# Patient Record
Sex: FEMALE | URBAN - METROPOLITAN AREA
[De-identification: names, ages, dates, MRNs, and addresses within clinical notes are randomized per-mention and may not be internally consistent; named-entity substitution may affect disease eponyms.]

---

## 2024-02-27 ENCOUNTER — OUTPATIENT (OUTPATIENT)
Dept: OUTPATIENT SERVICES | Facility: HOSPITAL | Age: 34
LOS: 1 days | End: 2024-02-27
Payer: COMMERCIAL

## 2024-02-27 VITALS — HEART RATE: 72 BPM | OXYGEN SATURATION: 100 %

## 2024-02-27 VITALS
TEMPERATURE: 98 F | SYSTOLIC BLOOD PRESSURE: 109 MMHG | HEART RATE: 71 BPM | DIASTOLIC BLOOD PRESSURE: 68 MMHG | RESPIRATION RATE: 17 BRPM

## 2024-02-27 DIAGNOSIS — Z90.89 ACQUIRED ABSENCE OF OTHER ORGANS: Chronic | ICD-10-CM

## 2024-02-27 DIAGNOSIS — O26.899 OTHER SPECIFIED PREGNANCY RELATED CONDITIONS, UNSPECIFIED TRIMESTER: ICD-10-CM

## 2024-02-27 LAB
APTT BLD: 24.9 SEC — SIGNIFICANT CHANGE UP (ref 24.5–35.6)
BASOPHILS # BLD AUTO: 0.05 K/UL — SIGNIFICANT CHANGE UP (ref 0–0.2)
BASOPHILS NFR BLD AUTO: 0.4 % — SIGNIFICANT CHANGE UP (ref 0–2)
EOSINOPHIL # BLD AUTO: 0.07 K/UL — SIGNIFICANT CHANGE UP (ref 0–0.5)
EOSINOPHIL NFR BLD AUTO: 0.6 % — SIGNIFICANT CHANGE UP (ref 0–6)
FIBRINOGEN PPP-MCNC: 416 MG/DL — SIGNIFICANT CHANGE UP (ref 200–445)
HCT VFR BLD CALC: 33.3 % — LOW (ref 34.5–45)
HGB BLD-MCNC: 11.4 G/DL — LOW (ref 11.5–15.5)
IMM GRANULOCYTES NFR BLD AUTO: 0.9 % — SIGNIFICANT CHANGE UP (ref 0–0.9)
INR BLD: 0.94 RATIO — SIGNIFICANT CHANGE UP (ref 0.85–1.18)
KLEIHAUER-BETKE CALCULATION: 0 % — SIGNIFICANT CHANGE UP (ref 0–0.2)
LYMPHOCYTES # BLD AUTO: 1.51 K/UL — SIGNIFICANT CHANGE UP (ref 1–3.3)
LYMPHOCYTES # BLD AUTO: 13 % — SIGNIFICANT CHANGE UP (ref 13–44)
MCHC RBC-ENTMCNC: 32.3 PG — SIGNIFICANT CHANGE UP (ref 27–34)
MCHC RBC-ENTMCNC: 34.2 GM/DL — SIGNIFICANT CHANGE UP (ref 32–36)
MCV RBC AUTO: 94.3 FL — SIGNIFICANT CHANGE UP (ref 80–100)
MONOCYTES # BLD AUTO: 0.75 K/UL — SIGNIFICANT CHANGE UP (ref 0–0.9)
MONOCYTES NFR BLD AUTO: 6.5 % — SIGNIFICANT CHANGE UP (ref 2–14)
NEUTROPHILS # BLD AUTO: 9.12 K/UL — HIGH (ref 1.8–7.4)
NEUTROPHILS NFR BLD AUTO: 78.6 % — HIGH (ref 43–77)
NRBC # BLD: 0 /100 WBCS — SIGNIFICANT CHANGE UP (ref 0–0)
PLATELET # BLD AUTO: 184 K/UL — SIGNIFICANT CHANGE UP (ref 150–400)
PROTHROM AB SERPL-ACNC: 9.9 SEC — SIGNIFICANT CHANGE UP (ref 9.5–13)
RBC # BLD: 3.53 M/UL — LOW (ref 3.8–5.2)
RBC # FLD: 13.2 % — SIGNIFICANT CHANGE UP (ref 10.3–14.5)
WBC # BLD: 11.6 K/UL — HIGH (ref 3.8–10.5)
WBC # FLD AUTO: 11.6 K/UL — HIGH (ref 3.8–10.5)

## 2024-02-27 PROCEDURE — 85730 THROMBOPLASTIN TIME PARTIAL: CPT

## 2024-02-27 PROCEDURE — 85460 HEMOGLOBIN FETAL: CPT

## 2024-02-27 PROCEDURE — 85025 COMPLETE CBC W/AUTO DIFF WBC: CPT

## 2024-02-27 PROCEDURE — 85384 FIBRINOGEN ACTIVITY: CPT

## 2024-02-27 PROCEDURE — G0463: CPT

## 2024-02-27 PROCEDURE — 85610 PROTHROMBIN TIME: CPT

## 2024-02-27 NOTE — OB RN TRIAGE NOTE - RESPIRATORY RATE (BREATHS/MIN)
18 You can access the [facility] Patient Portal, offered by [facility], by registering with the following website: [email or web address]

## 2024-02-27 NOTE — OB PROVIDER TRIAGE NOTE - NSHPPHYSICALEXAM_GEN_ALL_CORE
T(C): 36.7 (02-27-24 @ 01:52), Max: 36.7 (02-27-24 @ 01:52)  HR: 67 (02-27-24 @ 02:29) (61 - 85)  BP: 108/64 (02-27-24 @ 01:52) (108/64 - 108/64)  RR: 18 (02-27-24 @ 01:52) (18 - 18)  SpO2: 100% (02-27-24 @ 02:29) (92% - 100%)    Gen: NAD  CV: RRR  Pulm: breathing comfortably on RA  Abd: gravid, nontender  Extr: moving all extremities with ease  – FHT Cat I: baseline 125, mod variability, +accels, -decels  – [place]uf[place]: irritability with rare contraction not felt by patient  – Sono: vertex
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
## 2024-02-27 NOTE — OB RN TRIAGE NOTE - FALL HARM RISK - UNIVERSAL INTERVENTIONS
Bed in lowest position, wheels locked, appropriate side rails in place/Call bell, personal items and telephone in reach/Instruct patient to call for assistance before getting out of bed or chair/Non-slip footwear when patient is out of bed/[place] to call system/Physically safe environment - no spills, clutter or unnecessary equipment/Purposeful Proactive Rounding/Room/bathroom lighting operational, light cord in reach

## 2024-02-28 DIAGNOSIS — Z04.1 ENCOUNTER FOR EXAMINATION AND OBSERVATION FOLLOWING TRANSPORT ACCIDENT: ICD-10-CM

## 2024-02-28 DIAGNOSIS — O47.03 FALSE LABOR BEFORE 37 COMPLETED WEEKS OF GESTATION, THIRD TRIMESTER: ICD-10-CM

## 2024-02-28 DIAGNOSIS — Z3A.34 34 WEEKS GESTATION OF PREGNANCY: ICD-10-CM

## 2024-04-12 PROBLEM — Z98.890 OTHER SPECIFIED POSTPROCEDURAL STATES: Chronic | Status: ACTIVE | Noted: 2024-02-27

## 2024-04-12 PROBLEM — Z87.898 PERSONAL HISTORY OF OTHER SPECIFIED CONDITIONS: Chronic | Status: ACTIVE | Noted: 2024-02-27

## 2024-04-14 ENCOUNTER — INPATIENT (INPATIENT)
Facility: HOSPITAL | Age: 34
LOS: 3 days | Discharge: HOME | DRG: 833 | End: 2024-04-18
Attending: OBSTETRICS & GYNECOLOGY | Admitting: OBSTETRICS & GYNECOLOGY
Payer: COMMERCIAL

## 2024-04-14 VITALS
OXYGEN SATURATION: 97 % | SYSTOLIC BLOOD PRESSURE: 117 MMHG | TEMPERATURE: 99 F | DIASTOLIC BLOOD PRESSURE: 72 MMHG | HEART RATE: 90 BPM

## 2024-04-14 DIAGNOSIS — O48.0 POST-TERM PREGNANCY: ICD-10-CM

## 2024-04-14 DIAGNOSIS — Z90.89 ACQUIRED ABSENCE OF OTHER ORGANS: Chronic | ICD-10-CM

## 2024-04-14 LAB
BASOPHILS # BLD AUTO: 0.04 K/UL — SIGNIFICANT CHANGE UP (ref 0–0.2)
BASOPHILS NFR BLD AUTO: 0.3 % — SIGNIFICANT CHANGE UP (ref 0–2)
BLD GP AB SCN SERPL QL: NEGATIVE — SIGNIFICANT CHANGE UP
EOSINOPHIL # BLD AUTO: 0.02 K/UL — SIGNIFICANT CHANGE UP (ref 0–0.5)
EOSINOPHIL NFR BLD AUTO: 0.2 % — SIGNIFICANT CHANGE UP (ref 0–6)
HCT VFR BLD CALC: 36.6 % — SIGNIFICANT CHANGE UP (ref 34.5–45)
HGB BLD-MCNC: 12.6 G/DL — SIGNIFICANT CHANGE UP (ref 11.5–15.5)
IMM GRANULOCYTES NFR BLD AUTO: 1 % — HIGH (ref 0–0.9)
LYMPHOCYTES # BLD AUTO: 1.12 K/UL — SIGNIFICANT CHANGE UP (ref 1–3.3)
LYMPHOCYTES # BLD AUTO: 9.7 % — LOW (ref 13–44)
MCHC RBC-ENTMCNC: 31.5 PG — SIGNIFICANT CHANGE UP (ref 27–34)
MCHC RBC-ENTMCNC: 34.4 GM/DL — SIGNIFICANT CHANGE UP (ref 32–36)
MCV RBC AUTO: 91.5 FL — SIGNIFICANT CHANGE UP (ref 80–100)
MONOCYTES # BLD AUTO: 0.68 K/UL — SIGNIFICANT CHANGE UP (ref 0–0.9)
MONOCYTES NFR BLD AUTO: 5.9 % — SIGNIFICANT CHANGE UP (ref 2–14)
NEUTROPHILS # BLD AUTO: 9.6 K/UL — HIGH (ref 1.8–7.4)
NEUTROPHILS NFR BLD AUTO: 82.9 % — HIGH (ref 43–77)
NRBC # BLD: 0 /100 WBCS — SIGNIFICANT CHANGE UP (ref 0–0)
PLATELET # BLD AUTO: 205 K/UL — SIGNIFICANT CHANGE UP (ref 150–400)
RBC # BLD: 4 M/UL — SIGNIFICANT CHANGE UP (ref 3.8–5.2)
RBC # FLD: 13 % — SIGNIFICANT CHANGE UP (ref 10.3–14.5)
RH IG SCN BLD-IMP: POSITIVE — SIGNIFICANT CHANGE UP
RH IG SCN BLD-IMP: POSITIVE — SIGNIFICANT CHANGE UP
WBC # BLD: 11.58 K/UL — HIGH (ref 3.8–10.5)
WBC # FLD AUTO: 11.58 K/UL — HIGH (ref 3.8–10.5)

## 2024-04-14 RX ORDER — SODIUM CHLORIDE 9 MG/ML
1000 INJECTION, SOLUTION INTRAVENOUS
Refills: 0 | Status: DISCONTINUED | OUTPATIENT
Start: 2024-04-14 | End: 2024-04-15

## 2024-04-14 RX ORDER — CITRIC ACID/SODIUM CITRATE 300-500 MG
15 SOLUTION, ORAL ORAL EVERY 6 HOURS
Refills: 0 | Status: DISCONTINUED | OUTPATIENT
Start: 2024-04-14 | End: 2024-04-15

## 2024-04-14 RX ORDER — CHLORHEXIDINE GLUCONATE 213 G/1000ML
1 SOLUTION TOPICAL DAILY
Refills: 0 | Status: DISCONTINUED | OUTPATIENT
Start: 2024-04-14 | End: 2024-04-15

## 2024-04-14 RX ORDER — OXYTOCIN 10 UNIT/ML
333.33 VIAL (ML) INJECTION
Qty: 20 | Refills: 0 | Status: DISCONTINUED | OUTPATIENT
Start: 2024-04-14 | End: 2024-04-18

## 2024-04-14 RX ADMIN — SODIUM CHLORIDE 125 MILLILITER(S): 9 INJECTION, SOLUTION INTRAVENOUS at 18:19

## 2024-04-14 RX ADMIN — SODIUM CHLORIDE 125 MILLILITER(S): 9 INJECTION, SOLUTION INTRAVENOUS at 18:00

## 2024-04-14 NOTE — OB PROVIDER H&P - HISTORY OF PRESENT ILLNESS
33 y.o. Female [id number] 24 IUP @ 41  wks. gest., (-) GBS, presents for IOL for Post-Dates.  (+) FM.  (-) Ctrx.  (-) Vaginal Bleeding/Fluid.  Uncomplicated PNC.  EFW 3500 gms.    POBHx:  Denies    PGynHx:  Abn Pap, s/p Colpo, subsequent Pap nl    PMHx:  Denies    PSHx:  T&A age 11    Meds:  PNV 1 p.o. daily    NKDA    SocHx:  Denies smoking tobacco/ETOH/Illegal drug use    FHx:  Denies

## 2024-04-14 NOTE — OB RN PATIENT PROFILE - FALL HARM RISK - UNIVERSAL INTERVENTIONS
Bed in lowest position, wheels locked, appropriate side rails in place/Call bell, personal items and telephone in reach/Instruct patient to call for assistance before getting out of bed or chair/Non-slip footwear when patient is out of bed/[place] to call system/Purposeful Proactive Rounding/Room/bathroom lighting operational, light cord in reach

## 2024-04-14 NOTE — OB PROVIDER H&P - NS_CSECTION_OBGYN_ALL_OB_NU
Diet controlled   A1c (two weeks ago) 6.9%  Glucose 221 on admission    LDSSI  POCT BG q6h while NPO     0

## 2024-04-14 NOTE — OB PROVIDER H&P - ASSESSMENT
33 y.o. Female [id number] 24 IUP @ 41 1/ wks. gest., (-) GBS, presents for IOL for Post-Dates.  (+) FM.  (-) Ctrx.  (-) Vaginal Bleeding/Fluid.  Uncomplicated PNC.  EFW 3500 gms.    PLAN:  Admit to L&D  clear Liquid Diet, then NPO in labor  BR  EFM/TOCO  Anesthesia Consult Routine labs  IVF  IOL with PO Cytotec, then CB @ midnight, then Pitocin @ 5 a.m.  Anticipate vaginal delivery    ALICE Sam PA-C  D/W Dr. Escalante

## 2024-04-15 LAB — T PALLIDUM AB TITR SER: NEGATIVE — SIGNIFICANT CHANGE UP

## 2024-04-15 DEVICE — SURGICEL 2 X 14": Type: IMPLANTABLE DEVICE | Status: FUNCTIONAL

## 2024-04-15 RX ORDER — OXYTOCIN 10 UNIT/ML
2 VIAL (ML) INJECTION
Qty: 30 | Refills: 0 | Status: DISCONTINUED | OUTPATIENT
Start: 2024-04-15 | End: 2024-04-18

## 2024-04-15 RX ORDER — SODIUM CHLORIDE 9 MG/ML
1000 INJECTION INTRAMUSCULAR; INTRAVENOUS; SUBCUTANEOUS
Refills: 0 | Status: DISCONTINUED | OUTPATIENT
Start: 2024-04-15 | End: 2024-04-18

## 2024-04-15 RX ORDER — OXYTOCIN 10 UNIT/ML
4 VIAL (ML) INJECTION
Qty: 30 | Refills: 0 | Status: DISCONTINUED | OUTPATIENT
Start: 2024-04-15 | End: 2024-04-15

## 2024-04-15 RX ORDER — LANOLIN
1 OINTMENT (GRAM) TOPICAL EVERY 6 HOURS
Refills: 0 | Status: DISCONTINUED | OUTPATIENT
Start: 2024-04-15 | End: 2024-04-18

## 2024-04-15 RX ORDER — DIPHENHYDRAMINE HCL 50 MG
25 CAPSULE ORAL EVERY 6 HOURS
Refills: 0 | Status: DISCONTINUED | OUTPATIENT
Start: 2024-04-15 | End: 2024-04-18

## 2024-04-15 RX ORDER — SODIUM CHLORIDE 9 MG/ML
1000 INJECTION, SOLUTION INTRAVENOUS ONCE
Refills: 0 | Status: DISCONTINUED | OUTPATIENT
Start: 2024-04-15 | End: 2024-04-18

## 2024-04-15 RX ORDER — SODIUM CHLORIDE 9 MG/ML
500 INJECTION, SOLUTION INTRAVENOUS ONCE
Refills: 0 | Status: COMPLETED | OUTPATIENT
Start: 2024-04-15 | End: 2024-04-15

## 2024-04-15 RX ORDER — SODIUM CHLORIDE 9 MG/ML
1000 INJECTION, SOLUTION INTRAVENOUS
Refills: 0 | Status: DISCONTINUED | OUTPATIENT
Start: 2024-04-15 | End: 2024-04-18

## 2024-04-15 RX ORDER — CITRIC ACID/SODIUM CITRATE 300-500 MG
30 SOLUTION, ORAL ORAL ONCE
Refills: 0 | Status: DISCONTINUED | OUTPATIENT
Start: 2024-04-15 | End: 2024-04-18

## 2024-04-15 RX ORDER — SIMETHICONE 80 MG/1
80 TABLET, CHEWABLE ORAL EVERY 4 HOURS
Refills: 0 | Status: DISCONTINUED | OUTPATIENT
Start: 2024-04-15 | End: 2024-04-18

## 2024-04-15 RX ORDER — MAGNESIUM HYDROXIDE 400 MG/1
30 TABLET, CHEWABLE ORAL
Refills: 0 | Status: DISCONTINUED | OUTPATIENT
Start: 2024-04-15 | End: 2024-04-18

## 2024-04-15 RX ORDER — SODIUM CHLORIDE 9 MG/ML
1000 INJECTION, SOLUTION INTRAVENOUS ONCE
Refills: 0 | Status: COMPLETED | OUTPATIENT
Start: 2024-04-15 | End: 2024-04-15

## 2024-04-15 RX ORDER — HEPARIN SODIUM 5000 [USP'U]/ML
5000 INJECTION INTRAVENOUS; SUBCUTANEOUS EVERY 12 HOURS
Refills: 0 | Status: DISCONTINUED | OUTPATIENT
Start: 2024-04-15 | End: 2024-04-18

## 2024-04-15 RX ORDER — OXYCODONE HYDROCHLORIDE 5 MG/1
5 TABLET ORAL ONCE
Refills: 0 | Status: DISCONTINUED | OUTPATIENT
Start: 2024-04-15 | End: 2024-04-18

## 2024-04-15 RX ORDER — OXYTOCIN 10 UNIT/ML
333.33 VIAL (ML) INJECTION
Qty: 20 | Refills: 0 | Status: DISCONTINUED | OUTPATIENT
Start: 2024-04-15 | End: 2024-04-18

## 2024-04-15 RX ORDER — FAMOTIDINE 10 MG/ML
20 INJECTION INTRAVENOUS ONCE
Refills: 0 | Status: COMPLETED | OUTPATIENT
Start: 2024-04-15 | End: 2024-04-15

## 2024-04-15 RX ORDER — OXYCODONE HYDROCHLORIDE 5 MG/1
5 TABLET ORAL
Refills: 0 | Status: COMPLETED | OUTPATIENT
Start: 2024-04-15 | End: 2024-04-22

## 2024-04-15 RX ORDER — SODIUM CHLORIDE 9 MG/ML
300 INJECTION INTRAMUSCULAR; INTRAVENOUS; SUBCUTANEOUS ONCE
Refills: 0 | Status: COMPLETED | OUTPATIENT
Start: 2024-04-15 | End: 2024-04-15

## 2024-04-15 RX ORDER — ACETAMINOPHEN 500 MG
975 TABLET ORAL
Refills: 0 | Status: DISCONTINUED | OUTPATIENT
Start: 2024-04-15 | End: 2024-04-18

## 2024-04-15 RX ORDER — SODIUM CHLORIDE 9 MG/ML
500 INJECTION, SOLUTION INTRAVENOUS ONCE
Refills: 0 | Status: DISCONTINUED | OUTPATIENT
Start: 2024-04-15 | End: 2024-04-18

## 2024-04-15 RX ORDER — MORPHINE SULFATE 50 MG/1
2 CAPSULE, EXTENDED RELEASE ORAL ONCE
Refills: 0 | Status: DISCONTINUED | OUTPATIENT
Start: 2024-04-15 | End: 2024-04-16

## 2024-04-15 RX ORDER — KETOROLAC TROMETHAMINE 30 MG/ML
30 SYRINGE (ML) INJECTION EVERY 6 HOURS
Refills: 0 | Status: DISCONTINUED | OUTPATIENT
Start: 2024-04-15 | End: 2024-04-16

## 2024-04-15 RX ORDER — IBUPROFEN 200 MG
600 TABLET ORAL EVERY 6 HOURS
Refills: 0 | Status: COMPLETED | OUTPATIENT
Start: 2024-04-15 | End: 2025-03-14

## 2024-04-15 RX ORDER — TETANUS TOXOID, REDUCED DIPHTHERIA TOXOID AND ACELLULAR PERTUSSIS VACCINE, ADSORBED 5; 2.5; 8; 8; 2.5 [IU]/.5ML; [IU]/.5ML; UG/.5ML; UG/.5ML; UG/.5ML
0.5 SUSPENSION INTRAMUSCULAR ONCE
Refills: 0 | Status: DISCONTINUED | OUTPATIENT
Start: 2024-04-15 | End: 2024-04-18

## 2024-04-15 RX ORDER — ACETAMINOPHEN 500 MG
975 TABLET ORAL ONCE
Refills: 0 | Status: COMPLETED | OUTPATIENT
Start: 2024-04-15 | End: 2024-04-15

## 2024-04-15 RX ADMIN — FAMOTIDINE 20 MILLIGRAM(S): 10 INJECTION INTRAVENOUS at 13:16

## 2024-04-15 RX ADMIN — Medication 30 MILLIGRAM(S): at 20:42

## 2024-04-15 RX ADMIN — SODIUM CHLORIDE 600 MILLILITER(S): 9 INJECTION INTRAMUSCULAR; INTRAVENOUS; SUBCUTANEOUS at 08:26

## 2024-04-15 RX ADMIN — Medication 2 MILLIUNIT(S)/MIN: at 06:39

## 2024-04-15 RX ADMIN — SODIUM CHLORIDE 1000 MILLILITER(S): 9 INJECTION, SOLUTION INTRAVENOUS at 10:15

## 2024-04-15 RX ADMIN — Medication 30 MILLIGRAM(S): at 21:10

## 2024-04-15 RX ADMIN — Medication 975 MILLIGRAM(S): at 18:51

## 2024-04-15 RX ADMIN — Medication 975 MILLIGRAM(S): at 18:16

## 2024-04-15 RX ADMIN — SODIUM CHLORIDE 500 MILLILITER(S): 9 INJECTION, SOLUTION INTRAVENOUS at 07:47

## 2024-04-15 RX ADMIN — Medication 0.25 MILLIGRAM(S): at 05:51

## 2024-04-15 RX ADMIN — HEPARIN SODIUM 5000 UNIT(S): 5000 INJECTION INTRAVENOUS; SUBCUTANEOUS at 20:42

## 2024-04-15 RX ADMIN — Medication 975 MILLIGRAM(S): at 11:19

## 2024-04-15 RX ADMIN — Medication 975 MILLIGRAM(S): at 23:55

## 2024-04-15 RX ADMIN — Medication 15 MILLILITER(S): at 13:16

## 2024-04-15 RX ADMIN — SODIUM CHLORIDE 125 MILLILITER(S): 9 INJECTION INTRAMUSCULAR; INTRAVENOUS; SUBCUTANEOUS at 08:45

## 2024-04-15 RX ADMIN — Medication 975 MILLIGRAM(S): at 10:21

## 2024-04-15 NOTE — OB RN DELIVERY SUMMARY - NS_SEPSISRSKCALC_OBGYN_ALL_OB_FT
EOS calculated successfully. EOS Risk Factor: 0.5/1000 live births ([facility] national incidence); GA=41w2d; Temp=100.22; ROM=6.183; GBS='Negative'; Antibiotics='No antibiotics or any antibiotics < 2 hrs prior to birth'

## 2024-04-15 NOTE — OB PROVIDER LABOR PROGRESS NOTE - NSVAGINALEXAM_OBGYN_ALL_OB_DT
15-Apr-2024 04:45
15-Apr-2024 11:21
15-Apr-2024 13:10
15-Apr-2024 08:28
15-Apr-2024 15:07
15-Apr-2024 09:24
15-Apr-2024 05:15
15-Apr-2024 00:38

## 2024-04-15 NOTE — OB PROVIDER DELIVERY SUMMARY - NSSELHIDDEN_OBGYN_ALL_OB_FT
[NS_DeliveryAttending1_OBGYN_ALL_OB_FT:[id number]==],[NS_DeliveryAssist1_OBGYN_ALL_OB_FT:[id number]],[NS_DeliveryAssist2_OBGYN_ALL_OB_FT:[id number]] [NS_DeliveryAttending1_OBGYN_ALL_OB_FT:[id number]=],[NS_DeliveryAssist1_OBGYN_ALL_OB_FT:[id number]=],[NS_DeliveryAssist2_OBGYN_ALL_OB_FT:[id number]]

## 2024-04-15 NOTE — OB RN INTRAOPERATIVE NOTE - NSSELHIDDEN_OBGYN_ALL_OB_FT
[NS_DeliveryAttending1_OBGYN_ALL_OB_FT:[id number]],[NS_DeliveryAssist1_OBGYN_ALL_OB_FT:[id number]],[NS_DeliveryAssist2_OBGYN_ALL_OB_FT:[id number]=],[NS_DeliveryRN_OBGYN_ALL_OB_FT:[id number]],[NS_CirculateRN2_OBGYN_ALL_OB_FT:[id number]]

## 2024-04-15 NOTE — OB PROVIDER LABOR PROGRESS NOTE - NS_OBIHIFHRDETAILS_OBGYN_ALL_OB_FT
Cat 2
Cat 2
category 2, baseline 130, moderate variability, + accels, + recurrent late decelerations
130/mod/+acels/-decels
Cat 2
140 mod variability - 2 minute prolonged deceleration with spontaneous return to baseline
Cat 2
category 2, baseline 140, moderate variability, + accels, + recurrent variable decelerations
Cat 2

## 2024-04-15 NOTE — OB PROVIDER DELIVERY SUMMARY - NSPROVIDERDELIVERYNOTE_OBGYN_ALL_OB_FT
Primary LTCS due to category 2 tracing, uncomplicated.  Viable female infant, vertex presentation, weight 3135g , Apgars 9/9, cord gasses sent. Infant head noticeably not engaged in pelvis.  Grossly normal fallopian tubes, uterus, and ovaries.    EBL: 800  IVF: 2000  UOP: 350    Dr. Whiting and Dr. Piedra, PGY-2 at delivery  Sirisha Chase, PGY-1 (E4) spontaneous Primary LTCS due to category 2 tracing, uncomplicated.  Viable female infant, vertex presentation, weight 3135g , Apgars 9/9, cord gasses sent. Infant head noticeably not engaged in pelvis.  Grossly normal fallopian tubes, uterus, and ovaries.  Surgicel powder applied over hysterotomy.    EBL: 800  IVF: 2000  UOP: 350    Dr. Whiting and Dr. Piedra, PGY-2 at delivery  Sirisha Chase, PGY-1 Primary LTCS due to category 2 tracing, uncomplicated.  Viable female infant, vertex presentation, weight 3135g , Apgars 9/9, cord gasses sent.  Grossly normal fallopian tubes, uterus, and ovaries.  Surgicel powder applied over hysterotomy.    EBL: 800  IVF: 2000  UOP: 350    Dictation #[id number]    Dr. Whiting and Dr. Piedra, PGY-2 at delivery  Sirisha Chase, PGY-1 Primary LTCS due to category 2 tracing, uncomplicated.  Viable female infant, vertex presentation, weight 3135g , Apgars 9/9, cord gasses sent.  Grossly normal fallopian tubes, uterus, and ovaries.  Surgicel powder applied over hysterotomy.    EBL: 800  IVF: 1350  UOP: 350    Dictation #[id number]    Dr. Whiting and Dr. Piedra, PGY-2 at delivery  Sirisha Chase, PGY-1 Fusiform Excision Additional Text (Leave Blank If You Do Not Want): The margin was drawn around the clinically apparent lesion.  A fusiform shape was then drawn on the skin incorporating the lesion and margins.  Incisions were then made along these lines to the appropriate tissue plane and the lesion was extirpated.

## 2024-04-15 NOTE — PRE-ANESTHESIA EVALUATION ADULT - HEART RATE (BEATS/MIN)
PRE-SEDATION ASSESSMENT    CONSENT  Consent for procedure and sedation obtained: Yes    MEDICAL HISTORY  Significant medical/surgical history: No  Past Complications with Sedation/Anesthesia: No  Significant Family History: No  Smoking History: No  Alcohol/Drug abuse: No  Possible Pregnancy (LMP): No  Cardiac History: No  Respiratory History: No    PHYSICAL EXAM  History and Physical Reviewed: H&P completed today  Airway Risk History: No previous complications  Airway Anatomy : Class II  Heart : Normal  Lungs : Normal  LOC/Mental Status : Normal    OTHER FINDINGS  Reviewed current medications and allergies: Yes  Pertinent lab/diagnostic test reviewed: Yes    SEDATION RISK ASSESSMENT  Risk Status ASA: Class II - Normal patient with mild systemic disease  Plan for Sedation: Moderate Sedation  EKG Monitoring: Yes    NARRATIVE FINDINGS      56

## 2024-04-15 NOTE — OB PROVIDER LABOR PROGRESS NOTE - NS_SUBJECTIVE/OBJECTIVE_OBGYN_ALL_OB_FT
Pt evaluated at bedside for recurrent variable decels. Adequate ctx pattern. The patient has been the same exam since 7am. Discussed MOD with patient and patient agrees with plan to proceed with primary c/s for NRFHT.
Pt evaluated at bedside for AROM. AROM performed blood tinged. Pt has intermittent variable decels. Will re-assess for IUPC w/ amnio
Patient with category 2 tracing with recurrent variable decelerations.
Pt seen and evaluated at bedside for Cat 2 FHT.   VE unchanged   Terbutaline given   Pitocin has not been started     Vital Signs Last 24 Hrs  T(C): 37.1 (15 Apr 2024 05:09), Max: 37.3 (14 Apr 2024 19:03)  T(F): 98.78 (15 Apr 2024 05:09), Max: 99.14 (14 Apr 2024 19:03)  HR: 82 (15 Apr 2024 05:59) (56 - 107)  BP: 102/60 (15 Apr 2024 05:56) (98/59 - 154/75)  RR: 18 (15 Apr 2024 05:09) (18 - 18)  SpO2: 95% (15 Apr 2024 05:59) (90% - 100%)
R1 Progress Note    Patient seen and examined at bedside for placement of cervical balloon. Cervical balloon placed w/o complications. 60cc NS placed in uterine & vaginal balloons. Pt tolerated procedure well.    NAD  Vital Signs Last 24 Hrs  T(C): 37.1 (14 Apr 2024 23:27), Max: 37.3 (14 Apr 2024 19:03)  T(F): 98.78 (14 Apr 2024 23:27), Max: 99.14 (14 Apr 2024 19:03)  HR: 57 (15 Apr 2024 00:26) (56 - 94)  BP: 101/56 (14 Apr 2024 23:27) (101/56 - 117/72)  BP(mean): --  RR: 18 (14 Apr 2024 23:27) (18 - 18)  SpO2: 97% (15 Apr 2024 00:26) (93% - 100%)    Parameters below as of 14 Apr 2024 17:20  Patient On (Oxygen Delivery Method): room air
Patient seen and examined due to category 2 tracing with recurrent late decelerations.
Pt evaluated at bedside. No cervical change. intermittent variable decels. Repositioned with improvement.
Pt seen and examined at bedside for Cat 2 FHT, s/p epidural placement. VSS.   Cervical balloon remains in place  VE: 2/50/-3   LR bolus ordered, pt repositioned for resuscitative measures       Vital Signs Last 24 Hrs  T(C): 36.8 (15 Apr 2024 04:30), Max: 37.3 (14 Apr 2024 19:03)  T(F): 98.24 (15 Apr 2024 03:11), Max: 99.14 (14 Apr 2024 19:03)  HR: 85 (15 Apr 2024 04:54) (56 - 107)  BP: 98/59 (15 Apr 2024 04:54) (98/59 - 154/75)  RR: 18 (15 Apr 2024 04:30) (18 - 18)  SpO2: 99% (15 Apr 2024 04:54) (90% - 100%)
Pt seen and examined at bedside for Cat 2 tracing, cervical balloon removed.   VE: 2/70/-3 intact   Repositioned for resuscitative measures.     Vital Signs Last 24 Hrs  T(C): 37.1 (15 Apr 2024 05:09), Max: 37.3 (14 Apr 2024 19:03)  T(F): 98.78 (15 Apr 2024 05:09), Max: 99.14 (14 Apr 2024 19:03)  HR: 72 (15 Apr 2024 05:29) (56 - 107)  BP: 108/64 (15 Apr 2024 05:25) (98/59 - 154/75)  RR: 18 (15 Apr 2024 05:09) (18 - 18)  SpO2: 95% (15 Apr 2024 05:29) (90% - 100%)

## 2024-04-15 NOTE — OB NEONATOLOGY/PEDIATRICIAN DELIVERY SUMMARY - NSPEDSNEONOTESA_OBGYN_ALL_OB_FT
Requested by OB to attend primary C/S for Category II tracing. 41.2 week infant born to a 31yr old  mother with a history of abnormal PAP smear, with colposcopy. GBS negative. Maternal Labs neg/imm/nr. Maternal Blood Type O+. AROM at 0752, clear fluid. Maternal temp of 37.9, received Tylenol. Infant emerged vigorous and crying. Delayed cord clamping 1 minute. Dried, stimulated and suctioned. Exam unremarkable. APGARS 9/9.   Mother plans to breast feed exclusively

## 2024-04-15 NOTE — OB RN DELIVERY SUMMARY - NSSELHIDDEN_OBGYN_ALL_OB_FT
[NS_DeliveryAttending1_OBGYN_ALL_OB_FT:[id number]],[NS_DeliveryAssist1_OBGYN_ALL_OB_FT:[id number]],[NS_DeliveryAssist2_OBGYN_ALL_OB_FT:[id number]],[NS_DeliveryRN_OBGYN_ALL_OB_FT:[id number]],[NS_CirculateRN2_OBGYN_ALL_OB_FT:[id number]=]

## 2024-04-15 NOTE — OB PROVIDER LABOR PROGRESS NOTE - ASSESSMENT
A/P: 34yo P0 @41.2 weeks gestation admitted for late term IOL.  - IOL s/p PO/CB  - Plan to start pitocin @530a   - EFM/[place] continuous monitoring   - GBS negative   - Resuscitative measures prn   - Continue with epidural for pain mgmt    - Plan per Dr. Boris Martinez, PA-C   
A/P: 34yo P0 @41.2 weeks gestation admitted for late term IOL.  - IOL s/p PO/CB  - Plan to start pitocin when able s/p terbutaline   - EFM/[place] continuous monitoring   - GBS negative   - Resuscitative measures prn   - Continue with epidural for pain mgmt    - D/w Dr. Henok Martinez, PAAbdirashidC   
- Exam unchanged  - Pitocin paused  - Patient with continued category 2 tracing despite amnioinfusion, repositioning, and attempts at maternal resuscitation    Dr. Whiting called to bedside to discuss options with patient.  ANGELIQUE Chase, PGY-1
- IUPC placed, plan to start amnioinfusion with 300cc normal saline bolus and 125cc/hr maintenance rate due to recurrent variable decelerations  - Overall moderate variability and presence of accelerations reassuring  - Patient placed on right lateral side, improvement in decelerations after repositioning and amnioinfusion  - Currently on pitocin of 2, MVUs are adequate  - Continue pitocin and amnioinfusion  - Continuous EFM/[place]    d/w Dr. Alford,   ANGELIQUE Chase, PGY-1
A/P: 34yo P0 @41.2 weeks gestation admitted for late term IOL.  - Continue IOL with cervical balloon, s/p PO  - Plan to start pitocin @5-530a   - EFM/[place] continuous monitoring   - GBS negative   - Resuscitative measures prn   - LR bolus ordered   - Continue close BP monitoring s/p epidural   - Plan per Dr. Boris Martinez, PA-C 
EFM + [place] cat 2   AROM performed.  Reassess if no improvement in variables for IUPC/Amnio  Epidural effective    [email or web address]
A/P: P0 LT IOL s/p cervical balloon placement.   - FHR category 1   - c/w PO cytotec   - Pitocin @5a    D/w Dr. Boris Phillips, PGY-1 
EFM + toco cat 2   Anesthesia & Nursing notified  Prepare for  section    ryan
EFM + [place]co cat 2   Cont resuscitation  Cont pitocin  Counseled patient on possible c/s if continues     ryan

## 2024-04-16 LAB
BASOPHILS # BLD AUTO: 0.05 K/UL — SIGNIFICANT CHANGE UP (ref 0–0.2)
BASOPHILS NFR BLD AUTO: 0.3 % — SIGNIFICANT CHANGE UP (ref 0–2)
EOSINOPHIL # BLD AUTO: 0.04 K/UL — SIGNIFICANT CHANGE UP (ref 0–0.5)
EOSINOPHIL NFR BLD AUTO: 0.2 % — SIGNIFICANT CHANGE UP (ref 0–6)
HCT VFR BLD CALC: 27.7 % — LOW (ref 34.5–45)
HGB BLD-MCNC: 9.4 G/DL — LOW (ref 11.5–15.5)
IMM GRANULOCYTES NFR BLD AUTO: 0.7 % — SIGNIFICANT CHANGE UP (ref 0–0.9)
LYMPHOCYTES # BLD AUTO: 1.72 K/UL — SIGNIFICANT CHANGE UP (ref 1–3.3)
LYMPHOCYTES # BLD AUTO: 10 % — LOW (ref 13–44)
MCHC RBC-ENTMCNC: 31.3 PG — SIGNIFICANT CHANGE UP (ref 27–34)
MCHC RBC-ENTMCNC: 33.9 GM/DL — SIGNIFICANT CHANGE UP (ref 32–36)
MCV RBC AUTO: 92.3 FL — SIGNIFICANT CHANGE UP (ref 80–100)
MONOCYTES # BLD AUTO: 1.3 K/UL — HIGH (ref 0–0.9)
MONOCYTES NFR BLD AUTO: 7.5 % — SIGNIFICANT CHANGE UP (ref 2–14)
NEUTROPHILS # BLD AUTO: 14.01 K/UL — HIGH (ref 1.8–7.4)
NEUTROPHILS NFR BLD AUTO: 81.3 % — HIGH (ref 43–77)
NRBC # BLD: 0 /100 WBCS — SIGNIFICANT CHANGE UP (ref 0–0)
PLATELET # BLD AUTO: 179 K/UL — SIGNIFICANT CHANGE UP (ref 150–400)
RBC # BLD: 3 M/UL — LOW (ref 3.8–5.2)
RBC # FLD: 13 % — SIGNIFICANT CHANGE UP (ref 10.3–14.5)
WBC # BLD: 17.24 K/UL — HIGH (ref 3.8–10.5)
WBC # FLD AUTO: 17.24 K/UL — HIGH (ref 3.8–10.5)

## 2024-04-16 RX ORDER — IBUPROFEN 200 MG
600 TABLET ORAL EVERY 6 HOURS
Refills: 0 | Status: DISCONTINUED | OUTPATIENT
Start: 2024-04-16 | End: 2024-04-18

## 2024-04-16 RX ORDER — OXYCODONE HYDROCHLORIDE 5 MG/1
5 TABLET ORAL
Refills: 0 | Status: DISCONTINUED | OUTPATIENT
Start: 2024-04-16 | End: 2024-04-18

## 2024-04-16 RX ADMIN — Medication 30 MILLIGRAM(S): at 09:30

## 2024-04-16 RX ADMIN — Medication 30 MILLIGRAM(S): at 16:13

## 2024-04-16 RX ADMIN — Medication 975 MILLIGRAM(S): at 00:50

## 2024-04-16 RX ADMIN — Medication 600 MILLIGRAM(S): at 20:57

## 2024-04-16 RX ADMIN — Medication 30 MILLIGRAM(S): at 15:43

## 2024-04-16 RX ADMIN — Medication 975 MILLIGRAM(S): at 13:14

## 2024-04-16 RX ADMIN — Medication 975 MILLIGRAM(S): at 06:50

## 2024-04-16 RX ADMIN — OXYCODONE HYDROCHLORIDE 5 MILLIGRAM(S): 5 TABLET ORAL at 12:54

## 2024-04-16 RX ADMIN — Medication 30 MILLIGRAM(S): at 10:00

## 2024-04-16 RX ADMIN — Medication 600 MILLIGRAM(S): at 20:27

## 2024-04-16 RX ADMIN — OXYCODONE HYDROCHLORIDE 5 MILLIGRAM(S): 5 TABLET ORAL at 13:14

## 2024-04-16 RX ADMIN — HEPARIN SODIUM 5000 UNIT(S): 5000 INJECTION INTRAVENOUS; SUBCUTANEOUS at 20:27

## 2024-04-16 RX ADMIN — Medication 975 MILLIGRAM(S): at 18:33

## 2024-04-16 RX ADMIN — Medication 975 MILLIGRAM(S): at 12:43

## 2024-04-16 RX ADMIN — Medication 975 MILLIGRAM(S): at 19:09

## 2024-04-16 RX ADMIN — HEPARIN SODIUM 5000 UNIT(S): 5000 INJECTION INTRAVENOUS; SUBCUTANEOUS at 09:30

## 2024-04-16 RX ADMIN — Medication 975 MILLIGRAM(S): at 05:57

## 2024-04-16 RX ADMIN — Medication 30 MILLIGRAM(S): at 03:43

## 2024-04-16 NOTE — PROGRESS NOTE ADULT - SUBJECTIVE AND OBJECTIVE BOX
Day 1 of Anesthesia Pain Management Service    SUBJECTIVE: Doing ok  Pain Scale Score:          [X] Refer to charted pain scores    THERAPY:  s/p   2  mg PF epidural morphine on 4\15\2024       MEDICATIONS  (STANDING):  acetaminophen     Tablet .. 975 milliGRAM(s) Oral <User Schedule>  citric acid/sodium citrate Solution 30 milliLiter(s) Oral once  diphtheria/tetanus/pertussis (acellular) Vaccine (Adacel) 0.5 milliLiter(s) IntraMuscular once  heparin   Injectable 5000 Unit(s) SubCutaneous every 12 hours  ibuprofen  Tablet. 600 milliGRAM(s) Oral every 6 hours  ketorolac   Injectable 30 milliGRAM(s) IV Push every 6 hours  lactated ringers Bolus 1000 milliLiter(s) IV Bolus once  lactated ringers Bolus 500 milliLiter(s) IV Bolus once  lactated ringers. 1000 milliLiter(s) (125 mL/Hr) IV Continuous <Continuous>  morphine PF Epidural 2 milliGRAM(s) Epidural once  oxytocin Infusion 333.333 milliUNIT(s)/Min (1000 mL/Hr) IV Continuous <Continuous>  oxytocin Infusion 333.333 milliUNIT(s)/Min (1000 mL/Hr) IV Continuous <Continuous>  oxytocin Infusion. 2 milliUNIT(s)/Min (2 mL/Hr) IV Continuous <Continuous>  sodium chloride 0.9%. 1000 milliLiter(s) (125 mL/Hr) IV Continuous <Continuous>    MEDICATIONS  (PRN):  diphenhydrAMINE 25 milliGRAM(s) Oral every 6 hours PRN Pruritus  lanolin Ointment 1 Application(s) Topical every 6 hours PRN Sore Nipples  magnesium hydroxide Suspension 30 milliLiter(s) Oral two times a day PRN Constipation  oxyCODONE    IR 5 milliGRAM(s) Oral every 3 hours PRN Moderate to Severe Pain (4-10)  oxyCODONE    IR 5 milliGRAM(s) Oral once PRN Moderate to Severe Pain (4-10)  simethicone 80 milliGRAM(s) Chew every 4 hours PRN Gas      OBJECTIVE:    Sedation:        	[X] Alert	 [ ] Drowsy	[ ] Arousable      [ ] Asleep       [ ] Unresponsive    Side Effects:	[X] None 	[ ] Nausea	[ ] Vomiting         [ ] Pruritus  		[ ] Weakness            [ ] Numbness	          [ ] Other:    Vital Signs Last 24 Hrs  T(C): 36.5 (16 Apr 2024 08:59), Max: 37.9 (15 Apr 2024 10:09)  T(F): 97.7 (16 Apr 2024 08:59), Max: 100.22 (15 Apr 2024 10:09)  HR: 79 (16 Apr 2024 08:59) (49 - 94)  BP: 101/63 (16 Apr 2024 08:59) (95/63 - 133/80)  BP(mean): 78 (15 Apr 2024 17:00) (72 - 88)  RR: 18 (16 Apr 2024 08:59) (13 - 31)  SpO2: 96% (16 Apr 2024 08:59) (91% - 100%)    Parameters below as of 16 Apr 2024 08:59  Patient On (Oxygen Delivery Method): room air        ASSESSMENT/ PLAN  [X] Patient to be transitioned to prn analgesics after 24 hours  [X] Pain management per primary service, pain service to sign off   [X]Documentation and Verification of current medications

## 2024-04-16 NOTE — PROGRESS NOTE ADULT - SUBJECTIVE AND OBJECTIVE BOX
Patient seen and examined at bedside, no acute overnight events. No acute complaints, pain well controlled. Patient is ambulating, voiding spontaneously, passing flatus, and tolerating regular diet. Denies CP, SOB, N/V, HA, blurred vision, epigastric pain.    Vital Signs Last 24 Hours  T(C): 36.6 (04-16-24 @ 05:44), Max: 37.9 (04-15-24 @ 10:09)  HR: 81 (04-16-24 @ 05:44) (49 - 155)  BP: 95/63 (04-16-24 @ 05:44) (95/63 - 133/80)  RR: 18 (04-16-24 @ 05:44) (13 - 31)  SpO2: 97% (04-16-24 @ 05:44) (87% - 100%)    Physical Exam:  General: NAD  Abdomen: Soft, non-tender, non-distended, fundus firm  Incision: Pfannenstiel incision CDI, subcuticular suture closure  Pelvic: Lochia wnl    Labs:    Blood Type: O Positive  Antibody Screen: Negative  RPR: Negative               12.6   11.58 )-----------( 205      ( 04-14 @ 18:36 )             36.6         MEDICATIONS  (STANDING):  acetaminophen     Tablet .. 975 milliGRAM(s) Oral <User Schedule>  citric acid/sodium citrate Solution 30 milliLiter(s) Oral once  diphtheria/tetanus/pertussis (acellular) Vaccine (Adacel) 0.5 milliLiter(s) IntraMuscular once  heparin   Injectable 5000 Unit(s) SubCutaneous every 12 hours  ibuprofen  Tablet. 600 milliGRAM(s) Oral every 6 hours  ketorolac   Injectable 30 milliGRAM(s) IV Push every 6 hours  lactated ringers Bolus 1000 milliLiter(s) IV Bolus once  lactated ringers Bolus 500 milliLiter(s) IV Bolus once  lactated ringers. 1000 milliLiter(s) (125 mL/Hr) IV Continuous <Continuous>  morphine PF Epidural 2 milliGRAM(s) Epidural once  oxytocin Infusion 333.333 milliUNIT(s)/Min (1000 mL/Hr) IV Continuous <Continuous>  oxytocin Infusion 333.333 milliUNIT(s)/Min (1000 mL/Hr) IV Continuous <Continuous>  oxytocin Infusion. 2 milliUNIT(s)/Min (2 mL/Hr) IV Continuous <Continuous>  sodium chloride 0.9%. 1000 milliLiter(s) (125 mL/Hr) IV Continuous <Continuous>    MEDICATIONS  (PRN):  diphenhydrAMINE 25 milliGRAM(s) Oral every 6 hours PRN Pruritus  lanolin Ointment 1 Application(s) Topical every 6 hours PRN Sore Nipples  magnesium hydroxide Suspension 30 milliLiter(s) Oral two times a day PRN Constipation  oxyCODONE    IR 5 milliGRAM(s) Oral every 3 hours PRN Moderate to Severe Pain (4-10)  oxyCODONE    IR 5 milliGRAM(s) Oral once PRN Moderate to Severe Pain (4-10)  simethicone 80 milliGRAM(s) Chew every 4 hours PRN Gas

## 2024-04-17 RX ORDER — OXYCODONE HYDROCHLORIDE 5 MG/1
5 TABLET ORAL ONCE
Refills: 0 | Status: DISCONTINUED | OUTPATIENT
Start: 2024-04-17 | End: 2024-04-17

## 2024-04-17 RX ORDER — ACETAMINOPHEN 500 MG
3 TABLET ORAL
Qty: 0 | Refills: 0 | DISCHARGE
Start: 2024-04-17

## 2024-04-17 RX ORDER — IBUPROFEN 200 MG
1 TABLET ORAL
Qty: 0 | Refills: 0 | DISCHARGE
Start: 2024-04-17

## 2024-04-17 RX ADMIN — Medication 975 MILLIGRAM(S): at 17:23

## 2024-04-17 RX ADMIN — Medication 975 MILLIGRAM(S): at 00:48

## 2024-04-17 RX ADMIN — HEPARIN SODIUM 5000 UNIT(S): 5000 INJECTION INTRAVENOUS; SUBCUTANEOUS at 09:25

## 2024-04-17 RX ADMIN — Medication 600 MILLIGRAM(S): at 21:03

## 2024-04-17 RX ADMIN — Medication 600 MILLIGRAM(S): at 09:09

## 2024-04-17 RX ADMIN — Medication 600 MILLIGRAM(S): at 14:58

## 2024-04-17 RX ADMIN — MAGNESIUM HYDROXIDE 30 MILLILITER(S): 400 TABLET, CHEWABLE ORAL at 10:43

## 2024-04-17 RX ADMIN — Medication 600 MILLIGRAM(S): at 03:58

## 2024-04-17 RX ADMIN — Medication 975 MILLIGRAM(S): at 00:18

## 2024-04-17 RX ADMIN — Medication 975 MILLIGRAM(S): at 12:29

## 2024-04-17 RX ADMIN — OXYCODONE HYDROCHLORIDE 5 MILLIGRAM(S): 5 TABLET ORAL at 15:56

## 2024-04-17 RX ADMIN — OXYCODONE HYDROCHLORIDE 5 MILLIGRAM(S): 5 TABLET ORAL at 10:40

## 2024-04-17 RX ADMIN — Medication 600 MILLIGRAM(S): at 20:33

## 2024-04-17 RX ADMIN — Medication 600 MILLIGRAM(S): at 04:28

## 2024-04-17 RX ADMIN — Medication 975 MILLIGRAM(S): at 06:39

## 2024-04-17 RX ADMIN — Medication 975 MILLIGRAM(S): at 23:56

## 2024-04-17 RX ADMIN — OXYCODONE HYDROCHLORIDE 5 MILLIGRAM(S): 5 TABLET ORAL at 21:56

## 2024-04-17 RX ADMIN — OXYCODONE HYDROCHLORIDE 5 MILLIGRAM(S): 5 TABLET ORAL at 22:56

## 2024-04-17 RX ADMIN — OXYCODONE HYDROCHLORIDE 5 MILLIGRAM(S): 5 TABLET ORAL at 04:50

## 2024-04-17 NOTE — DISCHARGE NOTE OB - CARE PROVIDER_API CALL
Kurt Whiting  Obstetrics and Gynecology  [address], [address]  [place] [address]  Phone: [phone]  Fax: [phone]  Follow Up Time:

## 2024-04-17 NOTE — DISCHARGE NOTE OB - PATIENT PORTAL LINK FT
You can access the FollowMyHealth Patient Portal offered by [facility] by registering at the following website: [email or web address]. By joining [employer]’s FollowMyHealth portal, you will also be able to view your health information using other applications (apps) compatible with our system.

## 2024-04-17 NOTE — PROGRESS NOTE ADULT - SUBJECTIVE AND OBJECTIVE BOX
R1 POD#2 pLTCS Progress Note    Patient seen and examined at bedside, no acute overnight events. No acute complaints, pain well controlled. Patient is ambulating and tolerating regular diet. Has passed flatus. Patient voiding independently. Denies CP, SOB, N/V, HA, blurred vision, epigastric pain. Bleeding minimal.    Vital Signs Last 24 Hours  T(C): 36.5 (04-16-24 @ 17:01), Max: 36.6 (04-16-24 @ 05:44)  HR: 66 (04-16-24 @ 17:01) (66 - 86)  BP: 97/60 (04-16-24 @ 17:01) (95/63 - 113/74)  RR: 18 (04-16-24 @ 17:01) (18 - 18)  SpO2: 97% (04-16-24 @ 17:01) (96% - 97%)    I&O's Summary    15 Apr 2024 07:01  -  16 Apr 2024 07:00  --------------------------------------------------------  IN: 4114.3 mL / OUT: 4150 mL / NET: -35.7 mL        Physical Exam:  General: NAD  Abdomen: Soft, non-tender, non-distended, fundus firm  Incision: Pfannenstiel incision CDI, subcuticular suture closure  Pelvic: Lochia wnl    Labs:    Blood Type: O Positive  Antibody Screen: Negative  RPR: Negative               9.4    17.24 )-----------( 179      ( 04-16 @ 06:53 )             27.7                12.6   11.58 )-----------( 205      ( 04-14 @ 18:36 )             36.6         MEDICATIONS  (STANDING):  acetaminophen     Tablet .. 975 milliGRAM(s) Oral <User Schedule>  citric acid/sodium citrate Solution 30 milliLiter(s) Oral once  diphtheria/tetanus/pertussis (acellular) Vaccine (Adacel) 0.5 milliLiter(s) IntraMuscular once  heparin   Injectable 5000 Unit(s) SubCutaneous every 12 hours  ibuprofen  Tablet. 600 milliGRAM(s) Oral every 6 hours  lactated ringers Bolus 1000 milliLiter(s) IV Bolus once  lactated ringers Bolus 500 milliLiter(s) IV Bolus once  lactated ringers. 1000 milliLiter(s) (125 mL/Hr) IV Continuous <Continuous>  oxytocin Infusion 333.333 milliUNIT(s)/Min (1000 mL/Hr) IV Continuous <Continuous>  oxytocin Infusion 333.333 milliUNIT(s)/Min (1000 mL/Hr) IV Continuous <Continuous>  oxytocin Infusion. 2 milliUNIT(s)/Min (2 mL/Hr) IV Continuous <Continuous>  sodium chloride 0.9%. 1000 milliLiter(s) (125 mL/Hr) IV Continuous <Continuous>    MEDICATIONS  (PRN):  diphenhydrAMINE 25 milliGRAM(s) Oral every 6 hours PRN Pruritus  lanolin Ointment 1 Application(s) Topical every 6 hours PRN Sore Nipples  magnesium hydroxide Suspension 30 milliLiter(s) Oral two times a day PRN Constipation  oxyCODONE    IR 5 milliGRAM(s) Oral once PRN Moderate to Severe Pain (4-10)  oxyCODONE    IR 5 milliGRAM(s) Oral every 3 hours PRN Moderate to Severe Pain (4-10)  oxyCODONE    IR 5 milliGRAM(s) Oral once PRN Moderate to Severe Pain (4-10)  simethicone 80 milliGRAM(s) Chew every 4 hours PRN Gas

## 2024-04-17 NOTE — DISCHARGE NOTE OB - CARE PLAN
1 Principal Discharge DX:	 delivery delivered  Assessment and plan of treatment:	After discharge, please stay on pelvic rest for 6 weeks, meaning no sexual intercourse, no tampons and no douching.  No driving for 2 weeks as women can loose a lot of blood during delivery and there is a possibility of being lightheaded/fainting.  No lifting objects heavier than a gallon of milk for two weeks.  Expect to have vaginal bleeding/spotting for up to six weeks.  The bleeding should get lighter and more white/light brown with time.  For bleeding soaking more than a pad an hour or passing clots greater than the size of your fist, come in to the emergency department.    Follow up in the office in 2-3 weeks for incision check.  Call the office for noticeable increase in redness or swelling at incision, discharge from incision, or opening of skin at incision site.     You can take up to 600mg Ibuprofen every 6 hours and/or tylenol 975mg every 6 hours. Alternate medications every 3 hours (Take Ibuprofen in the morning and then tylenol 3 hours later)

## 2024-04-17 NOTE — DISCHARGE NOTE OB - NS MD DC FALL RISK RISK
For information on Fall & Injury Prevention, visit: [email or web address] OR  [email or web address] OR  https://www.cdc.gov/steadi/patient.html

## 2024-04-18 VITALS
DIASTOLIC BLOOD PRESSURE: 68 MMHG | SYSTOLIC BLOOD PRESSURE: 105 MMHG | OXYGEN SATURATION: 97 % | HEART RATE: 69 BPM | RESPIRATION RATE: 18 BRPM | TEMPERATURE: 98 F

## 2024-04-18 PROCEDURE — C1889: CPT

## 2024-04-18 PROCEDURE — 86780 TREPONEMA PALLIDUM: CPT

## 2024-04-18 PROCEDURE — 86850 RBC ANTIBODY SCREEN: CPT

## 2024-04-18 PROCEDURE — 86900 BLOOD TYPING SEROLOGIC ABO: CPT

## 2024-04-18 PROCEDURE — 59050 FETAL MONITOR W/REPORT: CPT

## 2024-04-18 PROCEDURE — 85025 COMPLETE CBC W/AUTO DIFF WBC: CPT

## 2024-04-18 PROCEDURE — 59025 FETAL NON-STRESS TEST: CPT

## 2024-04-18 PROCEDURE — 36415 COLL VENOUS BLD VENIPUNCTURE: CPT

## 2024-04-18 PROCEDURE — 86901 BLOOD TYPING SEROLOGIC RH(D): CPT

## 2024-04-18 RX ADMIN — OXYCODONE HYDROCHLORIDE 5 MILLIGRAM(S): 5 TABLET ORAL at 05:20

## 2024-04-18 RX ADMIN — OXYCODONE HYDROCHLORIDE 5 MILLIGRAM(S): 5 TABLET ORAL at 04:50

## 2024-04-18 RX ADMIN — Medication 975 MILLIGRAM(S): at 05:38

## 2024-04-18 RX ADMIN — Medication 975 MILLIGRAM(S): at 12:20

## 2024-04-18 RX ADMIN — Medication 975 MILLIGRAM(S): at 05:08

## 2024-04-18 RX ADMIN — Medication 600 MILLIGRAM(S): at 10:10

## 2024-04-18 RX ADMIN — MAGNESIUM HYDROXIDE 30 MILLILITER(S): 400 TABLET, CHEWABLE ORAL at 04:52

## 2024-04-18 RX ADMIN — Medication 975 MILLIGRAM(S): at 11:49

## 2024-04-18 RX ADMIN — Medication 600 MILLIGRAM(S): at 02:32

## 2024-04-18 RX ADMIN — Medication 600 MILLIGRAM(S): at 03:02

## 2024-04-18 RX ADMIN — Medication 600 MILLIGRAM(S): at 09:40

## 2024-04-18 RX ADMIN — Medication 975 MILLIGRAM(S): at 00:26

## 2024-04-18 NOTE — PROGRESS NOTE ADULT - ATTENDING COMMENTS
Pt is s/p  section. She is doing well without complaints. Denies HA, lightheadedness, dizziness, CP, SOB, palpitations, abdominal pain, heavy vaginal bleeding.     T(C): 36.6 (24 @ 05:27), Max: 36.6 (24 @ 09:05)  HR: 69 (24 @ 05:27) (62 - 69)  BP: 105/68 (24 @ 05:27) (105/68 - 116/72)  RR: 18 (24 @ 05:27) (18 - 18)  SpO2: 97% (24 @ 05:27) (97% - 99%)    Physical exam:   Abd: NTND, fundus firm and well contracted, incision CDI  VE: Appropriate vaginal bleeding    Plan  -Continue routine post partum care  -Monitor VS  -Agree with above plan/examination    ryan
Agree with assessment and plan. Pt doing well without complaints.   Vitals stable. Exam Benign  - Routine post partum care
Pt is s/p  section. She is doing well without complaints. Denies HA, lightheadedness, dizziness, CP, SOB, palpitations, abdominal pain, heavy vaginal bleeding.     T(C): 36.6 (24 @ 09:05), Max: 36.7 (24 @ 05:58)  HR: 62 (24 @ 09:05) (62 - 86)  BP: 111/70 (24 @ 09:05) (97/60 - 113/74)  RR: 18 (24 @ 09:05) (18 - 18)  SpO2: 99% (24 @ 09:05) (97% - 99%)    Physical exam:   Abd: NTND, fundus firm and well contracted, incision CDI  VE: Appropriate vaginal bleeding    Plan  -Continue routine post partum care  -Monitor VS  -Agree with above plan/examination    ryan

## 2024-04-18 NOTE — PROGRESS NOTE ADULT - SUBJECTIVE AND OBJECTIVE BOX
R1 POD#2 LTCS Progress Note    Patient seen and examined at bedside, no acute overnight events. No acute complaints, pain well controlled. Patient is ambulating and tolerating regular diet. Has passed flatus. Patient voiding independently. Denies CP, SOB, N/V, HA, blurred vision, epigastric pain. Bleeding minimal.    Vital Signs Last 24 Hours  T(C): 36.6 (04-18-24 @ 05:27), Max: 36.6 (04-17-24 @ 09:05)  HR: 69 (04-18-24 @ 05:27) (62 - 69)  BP: 105/68 (04-18-24 @ 05:27) (105/68 - 116/72)  RR: 18 (04-18-24 @ 05:27) (18 - 18)  SpO2: 97% (04-18-24 @ 05:27) (97% - 99%)    I&O's Summary      Physical Exam:  General: NAD  Abdomen: Soft, non-tender, non-distended, fundus firm  Incision: Pfannenstiel incision CDI, subcuticular suture closure  Pelvic: Lochia wnl    Labs:    Blood Type: O Positive  Antibody Screen: Negative  RPR: Negative               9.4    17.24 )-----------( 179      ( 04-16 @ 06:53 )             27.7                12.6   11.58 )-----------( 205      ( 04-14 @ 18:36 )             36.6         MEDICATIONS  (STANDING):  acetaminophen     Tablet .. 975 milliGRAM(s) Oral <User Schedule>  citric acid/sodium citrate Solution 30 milliLiter(s) Oral once  diphtheria/tetanus/pertussis (acellular) Vaccine (Adacel) 0.5 milliLiter(s) IntraMuscular once  heparin   Injectable 5000 Unit(s) SubCutaneous every 12 hours  ibuprofen  Tablet. 600 milliGRAM(s) Oral every 6 hours  lactated ringers Bolus 1000 milliLiter(s) IV Bolus once  lactated ringers Bolus 500 milliLiter(s) IV Bolus once  lactated ringers. 1000 milliLiter(s) (125 mL/Hr) IV Continuous <Continuous>  oxytocin Infusion 333.333 milliUNIT(s)/Min (1000 mL/Hr) IV Continuous <Continuous>  oxytocin Infusion 333.333 milliUNIT(s)/Min (1000 mL/Hr) IV Continuous <Continuous>  oxytocin Infusion. 2 milliUNIT(s)/Min (2 mL/Hr) IV Continuous <Continuous>  sodium chloride 0.9%. 1000 milliLiter(s) (125 mL/Hr) IV Continuous <Continuous>    MEDICATIONS  (PRN):  diphenhydrAMINE 25 milliGRAM(s) Oral every 6 hours PRN Pruritus  lanolin Ointment 1 Application(s) Topical every 6 hours PRN Sore Nipples  magnesium hydroxide Suspension 30 milliLiter(s) Oral two times a day PRN Constipation  oxyCODONE    IR 5 milliGRAM(s) Oral once PRN Moderate to Severe Pain (4-10)  oxyCODONE    IR 5 milliGRAM(s) Oral every 3 hours PRN Moderate to Severe Pain (4-10)  simethicone 80 milliGRAM(s) Chew every 4 hours PRN Gas   R1 POD#3 LTCS Progress Note    Patient seen and examined at bedside, no acute overnight events. No acute complaints, pain well controlled. Patient is ambulating and tolerating regular diet. Has passed flatus. Patient voiding independently. Denies CP, SOB, N/V, HA, blurred vision, epigastric pain. Bleeding minimal.    Vital Signs Last 24 Hours  T(C): 36.6 (04-18-24 @ 05:27), Max: 36.6 (04-17-24 @ 09:05)  HR: 69 (04-18-24 @ 05:27) (62 - 69)  BP: 105/68 (04-18-24 @ 05:27) (105/68 - 116/72)  RR: 18 (04-18-24 @ 05:27) (18 - 18)  SpO2: 97% (04-18-24 @ 05:27) (97% - 99%)    I&O's Summary      Physical Exam:  General: NAD  Abdomen: Soft, non-tender, non-distended, fundus firm  Incision: Pfannenstiel incision CDI, subcuticular suture closure  Pelvic: Lochia wnl    Labs:    Blood Type: O Positive  Antibody Screen: Negative  RPR: Negative               9.4    17.24 )-----------( 179      ( 04-16 @ 06:53 )             27.7                12.6   11.58 )-----------( 205      ( 04-14 @ 18:36 )             36.6         MEDICATIONS  (STANDING):  acetaminophen     Tablet .. 975 milliGRAM(s) Oral <User Schedule>  citric acid/sodium citrate Solution 30 milliLiter(s) Oral once  diphtheria/tetanus/pertussis (acellular) Vaccine (Adacel) 0.5 milliLiter(s) IntraMuscular once  heparin   Injectable 5000 Unit(s) SubCutaneous every 12 hours  ibuprofen  Tablet. 600 milliGRAM(s) Oral every 6 hours  lactated ringers Bolus 1000 milliLiter(s) IV Bolus once  lactated ringers Bolus 500 milliLiter(s) IV Bolus once  lactated ringers. 1000 milliLiter(s) (125 mL/Hr) IV Continuous <Continuous>  oxytocin Infusion 333.333 milliUNIT(s)/Min (1000 mL/Hr) IV Continuous <Continuous>  oxytocin Infusion 333.333 milliUNIT(s)/Min (1000 mL/Hr) IV Continuous <Continuous>  oxytocin Infusion. 2 milliUNIT(s)/Min (2 mL/Hr) IV Continuous <Continuous>  sodium chloride 0.9%. 1000 milliLiter(s) (125 mL/Hr) IV Continuous <Continuous>    MEDICATIONS  (PRN):  diphenhydrAMINE 25 milliGRAM(s) Oral every 6 hours PRN Pruritus  lanolin Ointment 1 Application(s) Topical every 6 hours PRN Sore Nipples  magnesium hydroxide Suspension 30 milliLiter(s) Oral two times a day PRN Constipation  oxyCODONE    IR 5 milliGRAM(s) Oral once PRN Moderate to Severe Pain (4-10)  oxyCODONE    IR 5 milliGRAM(s) Oral every 3 hours PRN Moderate to Severe Pain (4-10)  simethicone 80 milliGRAM(s) Chew every 4 hours PRN Gas

## 2024-04-18 NOTE — PROGRESS NOTE ADULT - ASSESSMENT
Patient is POD#1 from pLTCS for category 2 tracing,  and is meeting all postpartum milestones. Vital signs are stable, physical exam is unremarkable.  - Continue with po analgesia  - Increase ambulation  - Continue regular diet  - Continue Heparin SQ for DVT prophylaxis  - f/u AM CBC 4/16    ANGELIQUE Chase, PGY-1
Patient is POD#3 from [facility]LTCS for category 2 tracing,  and is meeting all postpartum milestones. Vital signs are stable, physical exam is unremarkable.  - Hct: 26.6->27.7  - Continue with po analgesia  - Increase ambulation  - Continue regular diet  - Continue Heparin SQ for DVT prophylaxis      Jh Phillips, PGY-1 
Patient is POD#2 from pLTCS for category 2 tracing,  and is meeting all postpartum milestones. Vital signs are stable, physical exam is unremarkable.  - Hct: 26.6->27.7  - Continue with po analgesia  - Increase ambulation  - Continue regular diet  - Continue Heparin SQ for DVT prophylaxis

## 2024-09-26 NOTE — OB NEONATOLOGY/PEDIATRICIAN DELIVERY SUMMARY - APGAR COMPLETED BY
BRANDAN Watson/Other Continue Regimen: Absorica 60mg qd and tretinoin 0.05 % topical cream twice weekly. Detail Level: Zone